# Patient Record
Sex: FEMALE | Race: WHITE | NOT HISPANIC OR LATINO | URBAN - METROPOLITAN AREA
[De-identification: names, ages, dates, MRNs, and addresses within clinical notes are randomized per-mention and may not be internally consistent; named-entity substitution may affect disease eponyms.]

---

## 2017-01-01 ENCOUNTER — HOME CARE/HOSPICE - HEALTHEAST (OUTPATIENT)
Dept: HOSPICE | Facility: HOSPICE | Age: 82
End: 2017-01-01

## 2017-01-01 ENCOUNTER — AMBULATORY - HEALTHEAST (OUTPATIENT)
Dept: GERIATRICS | Facility: CLINIC | Age: 82
End: 2017-01-01

## 2017-01-01 ENCOUNTER — OFFICE VISIT - HEALTHEAST (OUTPATIENT)
Dept: GERIATRICS | Facility: CLINIC | Age: 82
End: 2017-01-01

## 2017-01-01 ENCOUNTER — AMBULATORY - HEALTHEAST (OUTPATIENT)
Dept: ADMINISTRATIVE | Facility: CLINIC | Age: 82
End: 2017-01-01

## 2017-01-01 DIAGNOSIS — R52 PAIN MANAGEMENT: ICD-10-CM

## 2017-01-01 DIAGNOSIS — F41.9 ANXIETY: ICD-10-CM

## 2017-01-01 DIAGNOSIS — C54.1 ENDOMETRIAL CANCER (H): ICD-10-CM

## 2017-01-01 DIAGNOSIS — N39.0 URINARY TRACT INFECTION: ICD-10-CM

## 2017-01-01 DIAGNOSIS — F03.90 DEMENTIA (H): ICD-10-CM

## 2017-01-01 RX ORDER — BISACODYL 10 MG
10 SUPPOSITORY, RECTAL RECTAL DAILY PRN
Status: SHIPPED | COMMUNITY
Start: 2017-01-01

## 2017-01-01 RX ORDER — HALOPERIDOL 2 MG/ML
1 SOLUTION ORAL EVERY 4 HOURS
Status: SHIPPED | COMMUNITY
Start: 2017-01-01

## 2017-01-01 RX ORDER — MORPHINE SULFATE 100 MG/5ML
5 SOLUTION ORAL
Status: SHIPPED | COMMUNITY
Start: 2017-01-01

## 2017-01-01 RX ORDER — MORPHINE SULFATE 100 MG/5ML
5 SOLUTION ORAL EVERY 4 HOURS
Status: SHIPPED | COMMUNITY
Start: 2017-01-01

## 2017-01-01 RX ORDER — LORAZEPAM 0.5 MG/1
0.5 TABLET ORAL EVERY 4 HOURS PRN
Status: SHIPPED | COMMUNITY
Start: 2017-01-01

## 2021-06-08 NOTE — PROGRESS NOTES
[facility]      Facility:    [facility] [[id number]]  Code Status: DNR/DNI      Chief Complaint/Reason for Visit:  Chief Complaint   Patient presents with     H & P     multiple falls,dementia, behaviors, anxiety, endometrial cancer, pain management,       HPI:   Andreina is a 86 y.o. female who Resides here with a new admission at the [facility] after a journey which took place starting in November 2016. It was at that time she was diagnosed with endometrial cancer and she was scheduled to have surgery in January. I do not have a discharge summary I only have a history and physical so most of this information was from conversation I had with her daughter Blanca. She was currently residing in a memory care unit and she did have a fall which prompted an admission in [place] to a hospital in which she was transferred to [facility] in [place]. She did have a fall and was screaming out in pain. She was worked up in the hospital been very thoroughly and she did not have any acute fractures. That [facility] according to her daughter she underwent a hysterectomy and I believe salpingo-oophorectomy. I am not sure totally of this because I do not have any documentation however the daughter did tell me they took everything out. She was then transferred to a rehab unit and then was put here in the long-term care at [facility]. Since she's been here patient claim she is not having any pain however she is screaming out with behaviors. This is despite Seroquel and trazodone which does not seem to be touching this and I did have a long discussion with her daughter about hospice care. I believe she is likely a candidate secondary to her decline however we will get hospice consult her family's wishes. She was treated appropriately and transferred here to the long-term care stable condition.    Patient also did have a urinary tract infection and is currently treated with  Bactrim DS. She denies any urinary symptoms however the review of systems is negative. She claims she does not have any pain in there were no problems. Although staff indicates she cannot walk she does claim that she walks all the time. She is extremely pleasant during our interview and denies any problems however most of all claim she is not in any pain.    Past Medical History:  Past Medical History:   Diagnosis Date     Advanced dementia 2014     Endometrial cancer 01/08/2017     Hyperlipidemia 2009     Hypertension 2007     Restless leg syndrome 2013           Surgical History:  Past Surgical History:   Procedure Laterality Date     APPENDECTOMY  1946     DILATION AND CURETTAGE OF UTERUS  2016     EYE SURGERY Bilateral 2002    cataracts        Family History:   Family History   Problem Relation Age of Onset     Alzheimer's disease Sister      Alzheimer's disease Brother        Social History:    Social History     Social History     Marital status:      Spouse name: N/A     Number of children: N/A     Years of education: N/A     Social History Main Topics     Smoking status: Unknown If Ever Smoked     Smokeless tobacco: None     Alcohol use None     Drug use: None     Sexual activity: Not Asked     Other Topics Concern     None     Social History Narrative          Review of Systems   Constitutional: Negative for activity change, chills and fever.   HENT: Negative for hearing loss.    Eyes: Negative for visual disturbance.   Respiratory: Negative for apnea, chest tightness and shortness of breath.    Cardiovascular: Negative for chest pain and palpitations.   Gastrointestinal: Negative for abdominal pain, constipation, nausea and vomiting.   Endocrine: Negative for polydipsia, polyphagia and polyuria.   Genitourinary: Negative for decreased urine volume and urgency.   Musculoskeletal: Negative for neck pain and neck stiffness.   Skin: Negative for rash.   Hematological: Does not bruise/bleed easily.    Psychiatric/Behavioral: Negative for agitation and behavioral problems.       Vitals:    02/10/17 0725   BP: 137/72   Pulse: 87   Resp: 20   Temp: 97.6  F (36.4  C)   SpO2: 97%       Physical Exam   Constitutional: She appears well-developed and well-nourished. No distress.   HENT:   Head: Normocephalic and atraumatic.   Nose: Nose normal.   Mouth/Throat: Oropharynx is clear and moist. No oropharyngeal exudate.   Eyes: Conjunctivae and EOM are normal. Pupils are equal, round, and reactive to light. Right eye exhibits no discharge. Left eye exhibits no discharge. No scleral icterus.   Neck: Neck supple. No tracheal deviation present. No thyromegaly present.   Cardiovascular: Normal rate, regular rhythm and normal heart sounds.  Exam reveals no gallop and no friction rub.    No murmur heard.  Pulmonary/Chest: Effort normal. No respiratory distress. She has no wheezes. She has no rales.   Abdominal: Soft. Bowel sounds are normal. She exhibits distension. She exhibits no mass. There is no guarding.   Musculoskeletal: Normal range of motion. She exhibits no edema or tenderness.   Lymphadenopathy:     She has no cervical adenopathy.   Neurological: She is alert. No cranial nerve deficit.   Skin: Skin is warm and dry. No rash noted. She is not diaphoretic. No erythema. No pallor.       Medication List:  Current Outpatient Prescriptions   Medication Sig     acetaminophen (TYLENOL) 325 MG tablet Take 650 mg by mouth every 4 (four) hours as needed for pain.     gabapentin (NEURONTIN) 100 MG capsule Take 100 mg by mouth bedtime as needed.     gabapentin (NEURONTIN) 300 MG capsule Take 300 mg by mouth bedtime.     gabapentin (NEURONTIN) 300 MG capsule Take 200 mg by mouth every evening.     gabapentin (NEURONTIN) 300 MG capsule Take 300 mg by mouth daily.     hydroCHLOROthiazide (HYDRODIURIL) 25 MG tablet Take 25 mg by mouth daily.     Lactobacillus rhamnosus GG (CULTURELLE) 10-15 Billion cell capsule Take 1 capsule by mouth  2 (two) times a day.     lisinopril (PRINIVIL,ZESTRIL) 40 MG tablet Take 40 mg by mouth daily.     polyethylene glycol (MIRALAX) 17 gram packet Take 17 g by mouth daily as needed.     potassium chloride (KLOR-CON) 10 MEQ CR tablet Take 20 mEq by mouth daily.     QUEtiapine (SEROQUEL) 25 MG tablet Take 12.5 mg by mouth 2 (two) times a day.     senna-docusate (PERICOLACE) 8.6-50 mg tablet Take 2 tablets by mouth 2 (two) times a day.     traZODone (DESYREL) 50 MG tablet Take 25 mg by mouth bedtime.       Labs:    Assessment:    ICD-10-CM    1. Dementia F03.90    2. Urinary tract infection N39.0    3. Endometrial cancer C54.1    4. Anxiety F41.9    5. Pain management R52        Plan:  Plan at this time is to stop the as needed Tylenol and start Tylenol 650 mg every six hours scheduled. Patient I do not believe will be able to articulate any pain. I will give oxycodone 5 mg 2.5 mg every four hours as needed for pain which is mild and 5 mg every four hours as needed for pain while to moderate. Staff will perform pain assessments every four hours. Given the fact she is screaming outI will check a CBC and a basic eduar profile as well as a UA youe today. I will also get hospice consult from [facility] to come out today for an evaluation. This was requested by the daughter and no other changes to care plan at this time. Greater than 58 minutes was spent on this admission with greater than 50% of the time dedicated to coordination of care. This included formulation of a care plan as well as discussion with daughter on goals of care. All are in agreement with plan.        Electronically signed by: Jason Kidd DO